# Patient Record
Sex: FEMALE | Race: WHITE | NOT HISPANIC OR LATINO | URBAN - METROPOLITAN AREA
[De-identification: names, ages, dates, MRNs, and addresses within clinical notes are randomized per-mention and may not be internally consistent; named-entity substitution may affect disease eponyms.]

---

## 2019-11-05 ENCOUNTER — APPOINTMENT (RX ONLY)
Dept: URBAN - METROPOLITAN AREA CLINIC 90 | Facility: CLINIC | Age: 79
Setting detail: DERMATOLOGY
End: 2019-11-05

## 2019-11-05 DIAGNOSIS — L08.9 LOCAL INFECTION OF THE SKIN AND SUBCUTANEOUS TISSUE, UNSPECIFIED: ICD-10-CM

## 2019-11-05 DIAGNOSIS — R20.2 PARESTHESIA OF SKIN: ICD-10-CM

## 2019-11-05 DIAGNOSIS — L0390 CELLULITIS AND ABSCESS OF UNSPECIFIED SITES: ICD-10-CM

## 2019-11-05 DIAGNOSIS — L0391 CELLULITIS AND ABSCESS OF UNSPECIFIED SITES: ICD-10-CM

## 2019-11-05 DIAGNOSIS — L72.8 OTHER FOLLICULAR CYSTS OF THE SKIN AND SUBCUTANEOUS TISSUE: ICD-10-CM

## 2019-11-05 PROBLEM — I10 ESSENTIAL (PRIMARY) HYPERTENSION: Status: ACTIVE | Noted: 2019-11-05

## 2019-11-05 PROBLEM — L02.211 CUTANEOUS ABSCESS OF ABDOMINAL WALL: Status: ACTIVE | Noted: 2019-11-05

## 2019-11-05 PROCEDURE — ? TREATMENT REGIMEN

## 2019-11-05 PROCEDURE — ? COUNSELING

## 2019-11-05 PROCEDURE — ? PRESCRIPTION

## 2019-11-05 PROCEDURE — ? INCISION AND DRAINAGE

## 2019-11-05 PROCEDURE — 10061 I&D ABSCESS COMP/MULTIPLE: CPT

## 2019-11-05 PROCEDURE — 99203 OFFICE O/P NEW LOW 30 MIN: CPT | Mod: 25

## 2019-11-05 RX ORDER — DOXYCYCLINE HYCLATE 100 MG/1
TABLET, COATED ORAL BID
Qty: 10 | Refills: 0 | Status: ERX

## 2019-11-05 ASSESSMENT — LOCATION DETAILED DESCRIPTION DERM
LOCATION DETAILED: LEFT UPPER CUTANEOUS LIP
LOCATION DETAILED: EPIGASTRIC SKIN
LOCATION DETAILED: RIGHT SUPERIOR UPPER BACK
LOCATION DETAILED: LEFT UPPER CUTANEOUS LIP
LOCATION DETAILED: LEFT SUPERIOR UPPER BACK

## 2019-11-05 ASSESSMENT — LOCATION SIMPLE DESCRIPTION DERM
LOCATION SIMPLE: ABDOMEN
LOCATION SIMPLE: RIGHT UPPER BACK
LOCATION SIMPLE: LEFT UPPER BACK
LOCATION SIMPLE: LEFT LIP
LOCATION SIMPLE: LEFT LIP

## 2019-11-05 ASSESSMENT — LOCATION ZONE DERM
LOCATION ZONE: TRUNK
LOCATION ZONE: LIP
LOCATION ZONE: LIP

## 2019-11-05 NOTE — PROCEDURE: INCISION AND DRAINAGE
I&D Type: complicated
Size Of Lesion In Cm (Optional But May Be Required For Some Insurances): 0
Suture Text: The incision was partially closed with
Method: 11 blade
Wound Care: Vaseline
Curette: No
Anesthesia Type: 1% lidocaine without epinephrine
Detail Level: Simple
Packing?: plain packing strips
Dressing: dry sterile dressing
Anesthesia Volume In Cc: 1
Epidermal Closure: simple interrupted
Epidermal Sutures: 4-0 Ethilon
Lesion Type: Abscess
Post-Care Instructions: I reviewed with the patient in detail post-care instructions. Patient should keep wound covered and call the office should any redness, pain, swelling or worsening occur.
Consent was obtained and risks were reviewed including but not limited to delayed wound healing, infection, need for multiple I and D's, and pain.

## 2019-11-06 ENCOUNTER — APPOINTMENT (RX ONLY)
Dept: URBAN - METROPOLITAN AREA CLINIC 90 | Facility: CLINIC | Age: 79
Setting detail: DERMATOLOGY
End: 2019-11-06

## 2019-11-06 DIAGNOSIS — Z48.817 ENCOUNTER FOR SURGICAL AFTERCARE FOLLOWING SURGERY ON THE SKIN AND SUBCUTANEOUS TISSUE: ICD-10-CM

## 2019-11-06 DIAGNOSIS — L0391 CELLULITIS AND ABSCESS OF UNSPECIFIED SITES: ICD-10-CM | Status: RESOLVING

## 2019-11-06 DIAGNOSIS — L0390 CELLULITIS AND ABSCESS OF UNSPECIFIED SITES: ICD-10-CM | Status: RESOLVING

## 2019-11-06 PROBLEM — L02.211 CUTANEOUS ABSCESS OF ABDOMINAL WALL: Status: ACTIVE | Noted: 2019-11-06

## 2019-11-06 PROCEDURE — ? COUNSELING

## 2019-11-06 PROCEDURE — 99024 POSTOP FOLLOW-UP VISIT: CPT

## 2019-11-06 PROCEDURE — ? POST-OP WOUND CHECK

## 2019-11-06 ASSESSMENT — LOCATION ZONE DERM: LOCATION ZONE: TRUNK

## 2019-11-06 ASSESSMENT — LOCATION SIMPLE DESCRIPTION DERM: LOCATION SIMPLE: ABDOMEN

## 2019-11-06 ASSESSMENT — LOCATION DETAILED DESCRIPTION DERM: LOCATION DETAILED: EPIGASTRIC SKIN

## 2019-11-06 NOTE — PROCEDURE: POST-OP WOUND CHECK
Add 26223 Cpt? (Important Note: In 2017 The Use Of 47931 Is Being Tracked By Cms To Determine Future Global Period Reimbursement For Global Periods): yes
Detail Level: Simple
Wound Evaluated By: Erlinda Jennings

## 2019-12-04 ENCOUNTER — APPOINTMENT (RX ONLY)
Dept: URBAN - METROPOLITAN AREA CLINIC 90 | Facility: CLINIC | Age: 79
Setting detail: DERMATOLOGY
End: 2019-12-04

## 2019-12-04 DIAGNOSIS — L0390 CELLULITIS AND ABSCESS OF UNSPECIFIED SITES: ICD-10-CM | Status: RESOLVED

## 2019-12-04 DIAGNOSIS — L0391 CELLULITIS AND ABSCESS OF UNSPECIFIED SITES: ICD-10-CM | Status: RESOLVED

## 2019-12-04 DIAGNOSIS — L82.0 INFLAMED SEBORRHEIC KERATOSIS: ICD-10-CM

## 2019-12-04 PROBLEM — L02.211 CUTANEOUS ABSCESS OF ABDOMINAL WALL: Status: ACTIVE | Noted: 2019-12-04

## 2019-12-04 PROCEDURE — ? LIQUID NITROGEN

## 2019-12-04 PROCEDURE — 17110 DESTRUCTION B9 LES UP TO 14: CPT

## 2019-12-04 PROCEDURE — ? COUNSELING

## 2019-12-04 PROCEDURE — 99213 OFFICE O/P EST LOW 20 MIN: CPT | Mod: 25

## 2019-12-04 ASSESSMENT — LOCATION DETAILED DESCRIPTION DERM
LOCATION DETAILED: LEFT CLAVICULAR NECK
LOCATION DETAILED: EPIGASTRIC SKIN
LOCATION DETAILED: LEFT RIB CAGE
LOCATION DETAILED: LEFT LATERAL ABDOMEN

## 2019-12-04 ASSESSMENT — LOCATION ZONE DERM
LOCATION ZONE: TRUNK
LOCATION ZONE: NECK

## 2019-12-04 ASSESSMENT — LOCATION SIMPLE DESCRIPTION DERM
LOCATION SIMPLE: LEFT ANTERIOR NECK
LOCATION SIMPLE: ABDOMEN

## 2019-12-04 NOTE — PROCEDURE: LIQUID NITROGEN
Detail Level: Detailed
Consent: The patient's consent was obtained including but not limited to risks of crusting, scabbing, blistering, scarring, darker or lighter pigmentary change, recurrence, incomplete removal and infection.
Medical Necessity Information: It is in your best interest to select a reason for this procedure from the list below. All of these items fulfill various CMS LCD requirements except the new and changing color options.
Post-Care Instructions: I reviewed with the patient in detail post-care instructions. Patient is to wear sunprotection, and avoid picking at any of the treated lesions. Pt may apply Vaseline to crusted or scabbing areas.
Add 52 Modifier (Optional): no
Render Post-Care Instructions In Note?: yes
Duration Of Freeze Thaw-Cycle (Seconds): 8
Number Of Freeze-Thaw Cycles: 1 freeze-thaw cycle
Medical Necessity Clause: This procedure was medically necessary because the lesions that were treated were:

## 2020-11-25 ENCOUNTER — APPOINTMENT (RX ONLY)
Dept: URBAN - METROPOLITAN AREA CLINIC 90 | Facility: CLINIC | Age: 80
Setting detail: DERMATOLOGY
End: 2020-11-25

## 2020-11-25 VITALS — WEIGHT: 140 LBS | HEIGHT: 63 IN

## 2020-11-25 DIAGNOSIS — L91.8 OTHER HYPERTROPHIC DISORDERS OF THE SKIN: ICD-10-CM

## 2020-11-25 DIAGNOSIS — L72.8 OTHER FOLLICULAR CYSTS OF THE SKIN AND SUBCUTANEOUS TISSUE: ICD-10-CM

## 2020-11-25 DIAGNOSIS — L82.1 OTHER SEBORRHEIC KERATOSIS: ICD-10-CM

## 2020-11-25 PROCEDURE — 11200 RMVL SKIN TAGS UP TO&INC 15: CPT

## 2020-11-25 PROCEDURE — ? SKIN TAG REMOVAL

## 2020-11-25 PROCEDURE — ? COUNSELING

## 2020-11-25 PROCEDURE — 99213 OFFICE O/P EST LOW 20 MIN: CPT | Mod: 25

## 2020-11-25 ASSESSMENT — LOCATION DETAILED DESCRIPTION DERM
LOCATION DETAILED: LEFT CLAVICULAR NECK
LOCATION DETAILED: RIGHT CLAVICULAR SKIN
LOCATION DETAILED: LEFT LATERAL SUPERIOR CHEST
LOCATION DETAILED: PERIUMBILICAL SKIN
LOCATION DETAILED: STERNAL NOTCH
LOCATION DETAILED: SUBXIPHOID

## 2020-11-25 ASSESSMENT — LOCATION ZONE DERM
LOCATION ZONE: NECK
LOCATION ZONE: TRUNK

## 2020-11-25 ASSESSMENT — LOCATION SIMPLE DESCRIPTION DERM
LOCATION SIMPLE: ABDOMEN
LOCATION SIMPLE: CHEST
LOCATION SIMPLE: RIGHT CLAVICULAR SKIN
LOCATION SIMPLE: LEFT ANTERIOR NECK

## 2020-11-25 NOTE — PROCEDURE: SKIN TAG REMOVAL
Anesthesia Volume In Cc: 2
Consent: Written consent obtained and the risks of skin tag removal was reviewed with the patient including but not limited to bleeding, pigmentary change, infection, pain, and remote possibility of scarring.
Medical Necessity Clause: This procedure was medically necessary because the lesions that were treated were:
Include Z78.9 (Other Specified Conditions Influencing Health Status) As An Associated Diagnosis?: No
Add Associated Diagnoses If Applicable When Selecting Medical Necessity: Yes
Medical Necessity Information: It is in your best interest to select a reason for this procedure from the list below. All of these items fulfill various CMS LCD requirements except the new and changing color options.
Detail Level: Zone

## 2021-05-26 ENCOUNTER — APPOINTMENT (RX ONLY)
Dept: URBAN - METROPOLITAN AREA CLINIC 90 | Facility: CLINIC | Age: 81
Setting detail: DERMATOLOGY
End: 2021-05-26

## 2021-05-26 VITALS — HEIGHT: 63 IN | WEIGHT: 140 LBS

## 2021-05-26 DIAGNOSIS — D22 MELANOCYTIC NEVI: ICD-10-CM

## 2021-05-26 DIAGNOSIS — R20.2 PARESTHESIA OF SKIN: ICD-10-CM

## 2021-05-26 DIAGNOSIS — L81.4 OTHER MELANIN HYPERPIGMENTATION: ICD-10-CM

## 2021-05-26 DIAGNOSIS — L30.4 ERYTHEMA INTERTRIGO: ICD-10-CM

## 2021-05-26 PROBLEM — D22.5 MELANOCYTIC NEVI OF TRUNK: Status: ACTIVE | Noted: 2021-05-26

## 2021-05-26 PROCEDURE — ? TREATMENT REGIMEN

## 2021-05-26 PROCEDURE — 11301 SHAVE SKIN LESION 0.6-1.0 CM: CPT

## 2021-05-26 PROCEDURE — ? SHAVE REMOVAL

## 2021-05-26 PROCEDURE — 99213 OFFICE O/P EST LOW 20 MIN: CPT | Mod: 25

## 2021-05-26 PROCEDURE — ? COUNSELING

## 2021-05-26 PROCEDURE — ? RECOMMENDATIONS

## 2021-05-26 ASSESSMENT — LOCATION ZONE DERM
LOCATION ZONE: TRUNK
LOCATION ZONE: FACE

## 2021-05-26 ASSESSMENT — LOCATION DETAILED DESCRIPTION DERM
LOCATION DETAILED: LEFT INFERIOR CENTRAL MALAR CHEEK
LOCATION DETAILED: RIGHT SUPERIOR UPPER BACK
LOCATION DETAILED: SUBXIPHOID
LOCATION DETAILED: LEFT SUPERIOR UPPER BACK

## 2021-05-26 ASSESSMENT — LOCATION SIMPLE DESCRIPTION DERM
LOCATION SIMPLE: ABDOMEN
LOCATION SIMPLE: LEFT UPPER BACK
LOCATION SIMPLE: RIGHT UPPER BACK
LOCATION SIMPLE: LEFT CHEEK

## 2021-05-26 NOTE — PROCEDURE: TREATMENT REGIMEN
Detail Level: Simple
Otc Regimen: Avene spring water spray
Initiate Treatment: Cicalfate
Detail Level: Zone

## 2021-05-26 NOTE — PROCEDURE: SHAVE REMOVAL
Medical Necessity Information: It is in your best interest to select a reason for this procedure from the list below. All of these items fulfill various CMS LCD requirements except the new and changing color options.
Medical Necessity Clause: This procedure was medically necessary because the lesion that was treated was:
Lab: [facility]
Lab Facility: [id number]
Detail Level: Simple
Was A Bandage Applied: Yes
Size Of Lesion In Cm (Required): 0.9
X Size Of Lesion In Cm (Optional): 0
Size Of Margin In Cm (Margins Are Not Added To Billing Dimensions): 0.1
Biopsy Method: 15 blade
Anesthesia Type: 1% lidocaine without epinephrine
Hemostasis: Aluminum Chloride and Electrocautery
Wound Care: Petrolatum
Path Notes (To The Dermatopathologist): Please check margins
Render Path Notes In Note?: No
Consent: The provider's intent is to therapeutically remove the lesion in its entirety while at the same time obtaining a tissue sample for histopathologic examination. Consent was obtained from the patient. The risks and benefits to therapy were discussed in detail. Specifically, the risks of infection, scarring, bleeding, prolonged wound healing, incomplete removal, allergy to anesthesia, nerve injury and recurrence were addressed. Prior to the procedure, the treatment site was clearly identified and confirmed by the patient. All components of Universal Protocol/PAUSE Rule completed.
Post-Care Instructions: I reviewed with the patient in detail post-care instructions. Patient is to keep the biopsy site dry overnight, and then apply bacitracin twice daily until healed. Patient may apply hydrogen peroxide soaks to remove any crusting.
Notification Instructions: Patient will be notified of biopsy results. However, patient instructed to call the office if not contacted within 2 weeks.
Billing Type: [employer]

## 2021-05-26 NOTE — PROCEDURE: RECOMMENDATIONS
Recommendation Preamble: The following recommendations were made during the visit:
Detail Level: Zone
Render Risk Assessment In Note?: no
Recommendations (Free Text): Laser

## 2021-12-01 ENCOUNTER — APPOINTMENT (RX ONLY)
Dept: URBAN - METROPOLITAN AREA CLINIC 90 | Facility: CLINIC | Age: 81
Setting detail: DERMATOLOGY
End: 2021-12-01

## 2021-12-01 VITALS — WEIGHT: 140 LBS | HEIGHT: 63 IN

## 2021-12-01 DIAGNOSIS — R20.2 PARESTHESIA OF SKIN: ICD-10-CM

## 2021-12-01 DIAGNOSIS — D22 MELANOCYTIC NEVI: ICD-10-CM

## 2021-12-01 DIAGNOSIS — L72.8 OTHER FOLLICULAR CYSTS OF THE SKIN AND SUBCUTANEOUS TISSUE: ICD-10-CM

## 2021-12-01 PROBLEM — D22.9 MELANOCYTIC NEVI, UNSPECIFIED: Status: ACTIVE | Noted: 2021-12-01

## 2021-12-01 PROBLEM — D22.62 MELANOCYTIC NEVI OF LEFT UPPER LIMB, INCLUDING SHOULDER: Status: ACTIVE | Noted: 2021-12-01

## 2021-12-01 PROCEDURE — 99213 OFFICE O/P EST LOW 20 MIN: CPT

## 2021-12-01 PROCEDURE — ? COUNSELING

## 2021-12-01 PROCEDURE — ? DEFER

## 2021-12-01 PROCEDURE — ? TREATMENT REGIMEN

## 2021-12-01 ASSESSMENT — LOCATION DETAILED DESCRIPTION DERM
LOCATION DETAILED: LEFT UPPER CUTANEOUS LIP
LOCATION DETAILED: SUPERIOR THORACIC SPINE
LOCATION DETAILED: PERIUMBILICAL SKIN
LOCATION DETAILED: LEFT RADIAL DORSAL HAND
LOCATION DETAILED: LEFT RADIAL DORSAL HAND

## 2021-12-01 ASSESSMENT — LOCATION SIMPLE DESCRIPTION DERM
LOCATION SIMPLE: LEFT LIP
LOCATION SIMPLE: LEFT HAND
LOCATION SIMPLE: UPPER BACK
LOCATION SIMPLE: LEFT HAND
LOCATION SIMPLE: ABDOMEN

## 2021-12-01 ASSESSMENT — LOCATION ZONE DERM
LOCATION ZONE: HAND
LOCATION ZONE: HAND
LOCATION ZONE: LIP
LOCATION ZONE: TRUNK

## 2022-06-01 ENCOUNTER — APPOINTMENT (RX ONLY)
Dept: URBAN - METROPOLITAN AREA CLINIC 90 | Facility: CLINIC | Age: 82
Setting detail: DERMATOLOGY
End: 2022-06-01

## 2022-06-01 VITALS — HEIGHT: 63 IN | WEIGHT: 136 LBS

## 2022-06-01 DIAGNOSIS — L72.8 OTHER FOLLICULAR CYSTS OF THE SKIN AND SUBCUTANEOUS TISSUE: ICD-10-CM

## 2022-06-01 DIAGNOSIS — R20.2 PARESTHESIA OF SKIN: ICD-10-CM

## 2022-06-01 DIAGNOSIS — L82.1 OTHER SEBORRHEIC KERATOSIS: ICD-10-CM

## 2022-06-01 DIAGNOSIS — D22 MELANOCYTIC NEVI: ICD-10-CM

## 2022-06-01 DIAGNOSIS — L72.0 EPIDERMAL CYST: ICD-10-CM

## 2022-06-01 PROBLEM — D22.39 MELANOCYTIC NEVI OF OTHER PARTS OF FACE: Status: ACTIVE | Noted: 2022-06-01

## 2022-06-01 PROBLEM — D22.5 MELANOCYTIC NEVI OF TRUNK: Status: ACTIVE | Noted: 2022-06-01

## 2022-06-01 PROCEDURE — ? COUNSELING

## 2022-06-01 PROCEDURE — ? DEFER

## 2022-06-01 PROCEDURE — 99213 OFFICE O/P EST LOW 20 MIN: CPT

## 2022-06-01 ASSESSMENT — LOCATION SIMPLE DESCRIPTION DERM
LOCATION SIMPLE: LEFT LIP
LOCATION SIMPLE: RIGHT UPPER BACK
LOCATION SIMPLE: LEFT CHEEK
LOCATION SIMPLE: RIGHT TEMPLE
LOCATION SIMPLE: ABDOMEN
LOCATION SIMPLE: LEFT FOREHEAD
LOCATION SIMPLE: UPPER BACK

## 2022-06-01 ASSESSMENT — LOCATION ZONE DERM
LOCATION ZONE: FACE
LOCATION ZONE: TRUNK
LOCATION ZONE: LIP

## 2022-06-01 ASSESSMENT — LOCATION DETAILED DESCRIPTION DERM
LOCATION DETAILED: LEFT CENTRAL MALAR CHEEK
LOCATION DETAILED: LEFT FOREHEAD
LOCATION DETAILED: SUPERIOR THORACIC SPINE
LOCATION DETAILED: RIGHT MID TEMPLE
LOCATION DETAILED: RIGHT MEDIAL UPPER BACK
LOCATION DETAILED: PERIUMBILICAL SKIN
LOCATION DETAILED: LEFT UPPER CUTANEOUS LIP